# Patient Record
Sex: MALE | Employment: STUDENT | URBAN - METROPOLITAN AREA
[De-identification: names, ages, dates, MRNs, and addresses within clinical notes are randomized per-mention and may not be internally consistent; named-entity substitution may affect disease eponyms.]

---
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
## 2023-01-26 ENCOUNTER — ATHLETIC TRAINING SESSION (OUTPATIENT)
Dept: SPORTS MEDICINE | Facility: CLINIC | Age: 18
End: 2023-01-26
Payer: COMMERCIAL

## 2023-01-26 DIAGNOSIS — S76.311A STRAIN OF RIGHT HAMSTRING MUSCLE, INITIAL ENCOUNTER: Primary | ICD-10-CM

## 2023-02-01 NOTE — PROGRESS NOTES
Subjective:          Chief Complaint: Rian Rubio is a 18 y.o. male student at  who had no chief complaint listed for this encounter.    HPI    ROS                Objective:        General: Rian is well-developed, well-nourished, appears stated age, in no acute distress, alert and oriented to time, place and person.     AT Session            Assessment:                 Plan:         1. ***  2. Physician Referral: {YES/NO:20292}  3. ED Referral: {YES/NO:20292}  4. Parent/Guardian Notified: {Athlete Parent Communication:92815}  5. All questions were answered, ath. will contact me for questions or concerns in  the interim.  6.         Eligible to use School Insurance: { SCHOOL INSURANCE:53304}

## 2023-02-01 NOTE — PROGRESS NOTES
Subjective:          Chief Complaint: Rian Rubio is a 18 y.o. male student at  who had concerns including Injury of the Right Thigh.    SUBJECTIVE:  Rian Rubio is a 18 y.o. male who complains of injury to the right thigh 2 week(s) ago. This occurred during a Track Meet at [facility] on 1/13/2023 yet he did not reach out to AT staff until 1/25/2023.Immediate symptoms: immediate pain. Symptoms have been intermittent since that time. Prior history of related problems: no prior problems with this area in the past. There is pain and swelling at the proximal third of the hamstring of that thigh. 3/10 pain at present time. Says that biggest cause of pain is flexing the knee. Has not been able to run. Has not done anything for his condition at this time.     OBJECTIVE:  He appears well, vital signs are normal. There is tenderness over the proximal third of the semitendinosus. No tenderness over the ischial tuberosity, gluteus muscles or distal hamstring tendons. The rest of the thigh, knee and leg exam is normal.    ASSESSMENT:  Proxomal Hamstring strain    PLAN:  rest the injured area as much as practical, compressive bandage, start HEP, see Dr. DOC Sadler on 1/28/2023.         Injury  This is a new problem. The current episode started 1 to 4 weeks ago. The problem occurs intermittently. The problem has been unchanged. The symptoms are aggravated by bending and exertion. He has tried nothing for the symptoms.     Review of Systems   All other systems reviewed and are negative.                Objective:        General: Rian is well-developed, well-nourished, appears stated age, in no acute distress, alert and oriented to time, place and person.         General Musculoskeletal Exam   Gait: normal     Right Ankle/Foot Exam     Tests   Heel Walk: able to perform  Tiptoe Walk: able to perform  Double Heel Rise: unable to perform double heel rise      Right Knee Exam     Inspection   Alignment:  normal  Swelling:  absent  Effusion: absent  Bruising: absent    Tenderness   Right knee tenderness location: Tenderness over proximal third of hamstring.    Range of Motion   Extension:  normal   Flexion:  abnormal Right knee flexion: pain, limited ROM.    Tests   Ligament Examination   Lachman: normal (-1 to 2mm)   PCL-Posterior Drawer: normal (0 to 2mm)     MCL - Valgus: normal (0 to 2mm)  LCL - Varus: normal  Pivot Shift: normal (Equal)    Other   Sensation: normal    Left Knee Exam   Left knee exam is normal.    Right Hip Exam     Inspection   Swelling: absent  Bruising: absent  No deformity of hip.  Quadriceps Atrophy:  Negative  Erythema: absent    Range of Motion   Abduction:  normal   Adduction:  normal   Extension:  abnormal Right hip extension: slight pain.  Flexion:  normal   External rotation:  normal   Internal rotation:  normal     Tests   Pain w/ forced internal rotation (JESSICA): absent  Bryan: negative    Other   Sensation: normal  Left Hip Exam   Left hip exam is normal.      Back (L-Spine & T-Spine) / Neck (C-Spine) Exam     Back (L-Spine & T-Spine) Tests   Right Side Tests  Squat Test: able to perform      Muscle Strength   Right Lower Extremity   Hip Abduction: 5/5   Hip Adduction: 5/5   Quadriceps:  5/5   Hamstrin/5   Ankle Dorsiflexion:  5/5             Assessment:         Right Proximal Hamstring Strain        Plan:         Continue to limit running   Start HEP program, schedule to follow up with ATC  Meet with Dr. DOC Sadler on 2023

## 2023-02-08 ENCOUNTER — ATHLETIC TRAINING SESSION (OUTPATIENT)
Dept: SPORTS MEDICINE | Facility: CLINIC | Age: 18
End: 2023-02-08
Payer: COMMERCIAL

## 2023-02-08 DIAGNOSIS — S76.311D STRAIN OF RIGHT HAMSTRING MUSCLE, SUBSEQUENT ENCOUNTER: Primary | ICD-10-CM

## 2023-02-08 NOTE — PROGRESS NOTES
---------------------------------------------------------------------------------------  ---------------------------------------------------------------------------------------    [facility]  Daily therapy exercises      (date: 2/8/23)    Rian completed therapeutic exercises to develop strength, endurance, and ROM    Subjective: ath states that he only experiences pain with 100% sprinting.  He has zero pain during ADLs or after running.  Ath reports being compliant with the HEP and he feels that it has helped.     Objective:  athlete reports to the athletic training facility for a follow up.       Rian received the following  modalities and/or manual therapy techniques:      Modality/Manual Therapy Time   E-stim IFC    [facility] premod    [facility]     IASTM- medial hamstring  10    Cupping           Athlete will continue HEP and limit sprints to 75% capacity thorough 2/13, at which time he will check in with the medical staff.    has been informed of the above information.   The athlete indicates understanding of the plan of care and concerns were addressed. All questions were answered to the best of my ability at this time.         Anyi Delgadillo, LAT, ATC, DEBORAH  Lead   [facility]  [email or web address]

## 2023-02-15 ENCOUNTER — ATHLETIC TRAINING SESSION (OUTPATIENT)
Dept: SPORTS MEDICINE | Facility: CLINIC | Age: 18
End: 2023-02-15
Payer: COMMERCIAL

## 2023-02-15 DIAGNOSIS — S76.311D STRAIN OF RIGHT HAMSTRING MUSCLE, SUBSEQUENT ENCOUNTER: Primary | ICD-10-CM

## 2023-02-15 NOTE — PROGRESS NOTES
2/15/2023    MAINTENANCE LOG  INJURY/CONDITON: R Hamstring      MODALITIES:    MISCELLANEOUS:       []Active Release   []Compression Wrap   []Cupping    []Support Wrap  []E-Stim- Compex   [x]Taping - KT Tape  []E-Stim- IFC    []Foam Roller  []E-Stim- Premod   []Cold Tub  []Joint Mobilization   []Contrast Tub  []Manual Therapy   []Hot Pack  []Massage    []Hot Tub  []Massage - Scar Tissue  []Ice Cup  []Myofascial Release   []Ice Pack  []Flossing/BFR  []Ultrasound  []Ultrasound - Phonophoresis  [x]Instrumental Assisted Soft Tissue Mobilization (IASTM)  []Intermittent Compression - Normatec  []Massage Gun  []ROM - Active  []ROM - Passive  []Stretching - Active  []Stretching - Dynamic  []Stretching - Passive  []Stretching - PNF  []Stretching - Static  []Mobility Work - Hip/Back  []Mobility Work - Ankle  []Mobility Work - Shoulder      OTHER/COMMENTS:  -Completing HEP as instructed  -Says he's had no pain over the last week  -Will progress and try sprinting at 100%

## 2023-04-05 ENCOUNTER — ATHLETIC TRAINING SESSION (OUTPATIENT)
Dept: SPORTS MEDICINE | Facility: CLINIC | Age: 18
End: 2023-04-05
Payer: COMMERCIAL

## 2023-04-05 DIAGNOSIS — M79.10 MUSCLE SORENESS: Primary | ICD-10-CM

## 2023-04-05 NOTE — PROGRESS NOTES
4/5/2023    MAINTENANCE LOG  INJURY/CONDITON: Hamstring      MODALITIES:    MISCELLANEOUS:       []Active Release   []Compression Wrap   []Cupping    []Support Wrap  []E-Stim- Compex   []Taping  []E-Stim- IFC    []Foam Roller  []E-Stim- Premod   []Cold Tub  []Joint Mobilization   []Contrast Tub  []Manual Therapy   []Hot Pack  [x]Massage    []Hot Tub  []Massage - Scar Tissue  []Ice Cup  []Myofascial Release   []Ice Pack  []Flossing/BFR  []Ultrasound  []Ultrasound - Phonophoresis  [x]Instrumental Assisted Soft Tissue Mobilization (IASTM)  []Intermittent Compression - Normatec  []Massage Gun  []ROM - Active  []ROM - Passive  []Stretching - Active  []Stretching - Dynamic  []Stretching - Passive  []Stretching - PNF  []Stretching - Static  []Mobility Work - Hip/Back  []Mobility Work - Ankle  []Mobility Work - Shoulder      OTHER/COMMENTS:  -States he has not completed maintenance HEP in multiple weeks  -Only reports pain/discomfort immediately after racing